# Patient Record
Sex: FEMALE | Race: WHITE | Employment: OTHER | URBAN - METROPOLITAN AREA
[De-identification: names, ages, dates, MRNs, and addresses within clinical notes are randomized per-mention and may not be internally consistent; named-entity substitution may affect disease eponyms.]

---

## 2020-01-13 ENCOUNTER — APPOINTMENT (OUTPATIENT)
Dept: GENERAL RADIOLOGY | Facility: CLINIC | Age: 75
End: 2020-01-13
Attending: EMERGENCY MEDICINE
Payer: MEDICARE

## 2020-01-13 ENCOUNTER — HOSPITAL ENCOUNTER (EMERGENCY)
Facility: CLINIC | Age: 75
Discharge: HOME | End: 2020-01-13
Attending: EMERGENCY MEDICINE | Admitting: EMERGENCY MEDICINE
Payer: MEDICARE

## 2020-01-13 ENCOUNTER — APPOINTMENT (OUTPATIENT)
Dept: CARDIOLOGY | Facility: CLINIC | Age: 75
End: 2020-01-13
Attending: EMERGENCY MEDICINE
Payer: MEDICARE

## 2020-01-13 VITALS
OXYGEN SATURATION: 96 % | RESPIRATION RATE: 8 BRPM | HEART RATE: 66 BPM | DIASTOLIC BLOOD PRESSURE: 94 MMHG | TEMPERATURE: 97.9 F | SYSTOLIC BLOOD PRESSURE: 152 MMHG

## 2020-01-13 DIAGNOSIS — R53.83 OTHER FATIGUE: ICD-10-CM

## 2020-01-13 DIAGNOSIS — R00.2 PALPITATIONS: ICD-10-CM

## 2020-01-13 LAB
ALBUMIN SERPL-MCNC: 3.8 G/DL (ref 3.4–5)
ALP SERPL-CCNC: 79 U/L (ref 40–150)
ALT SERPL W P-5'-P-CCNC: 15 U/L (ref 0–50)
ANION GAP SERPL CALCULATED.3IONS-SCNC: 5 MMOL/L (ref 3–14)
AST SERPL W P-5'-P-CCNC: 15 U/L (ref 0–45)
BASOPHILS # BLD AUTO: 0.1 10E9/L (ref 0–0.2)
BASOPHILS NFR BLD AUTO: 1.4 %
BILIRUB SERPL-MCNC: 0.5 MG/DL (ref 0.2–1.3)
BUN SERPL-MCNC: 17 MG/DL (ref 7–30)
CALCIUM SERPL-MCNC: 9.3 MG/DL (ref 8.5–10.1)
CHLORIDE SERPL-SCNC: 109 MMOL/L (ref 94–109)
CO2 SERPL-SCNC: 25 MMOL/L (ref 20–32)
CREAT SERPL-MCNC: 0.53 MG/DL (ref 0.52–1.04)
DIFFERENTIAL METHOD BLD: NORMAL
EOSINOPHIL # BLD AUTO: 0.2 10E9/L (ref 0–0.7)
EOSINOPHIL NFR BLD AUTO: 4.7 %
ERYTHROCYTE [DISTWIDTH] IN BLOOD BY AUTOMATED COUNT: 12.5 % (ref 10–15)
GFR SERPL CREATININE-BSD FRML MDRD: >90 ML/MIN/{1.73_M2}
GLUCOSE SERPL-MCNC: 104 MG/DL (ref 70–99)
HCT VFR BLD AUTO: 44.8 % (ref 35–47)
HGB BLD-MCNC: 14.4 G/DL (ref 11.7–15.7)
IMM GRANULOCYTES # BLD: 0 10E9/L (ref 0–0.4)
IMM GRANULOCYTES NFR BLD: 0.2 %
INTERPRETATION ECG - MUSE: NORMAL
LYMPHOCYTES # BLD AUTO: 1.5 10E9/L (ref 0.8–5.3)
LYMPHOCYTES NFR BLD AUTO: 29.1 %
MAGNESIUM SERPL-MCNC: 2.2 MG/DL (ref 1.6–2.3)
MCH RBC QN AUTO: 29.4 PG (ref 26.5–33)
MCHC RBC AUTO-ENTMCNC: 32.1 G/DL (ref 31.5–36.5)
MCV RBC AUTO: 92 FL (ref 78–100)
MONOCYTES # BLD AUTO: 0.4 10E9/L (ref 0–1.3)
MONOCYTES NFR BLD AUTO: 8.1 %
NEUTROPHILS # BLD AUTO: 2.9 10E9/L (ref 1.6–8.3)
NEUTROPHILS NFR BLD AUTO: 56.5 %
NRBC # BLD AUTO: 0 10*3/UL
NRBC BLD AUTO-RTO: 0 /100
PLATELET # BLD AUTO: 214 10E9/L (ref 150–450)
POTASSIUM SERPL-SCNC: 3.7 MMOL/L (ref 3.4–5.3)
PROT SERPL-MCNC: 7.6 G/DL (ref 6.8–8.8)
RBC # BLD AUTO: 4.89 10E12/L (ref 3.8–5.2)
SODIUM SERPL-SCNC: 139 MMOL/L (ref 133–144)
T4 FREE SERPL-MCNC: 1.41 NG/DL (ref 0.76–1.46)
TROPONIN I SERPL-MCNC: <0.015 UG/L (ref 0–0.04)
TSH SERPL DL<=0.005 MIU/L-ACNC: 5.76 MU/L (ref 0.4–4)
WBC # BLD AUTO: 5.2 10E9/L (ref 4–11)

## 2020-01-13 PROCEDURE — 84484 ASSAY OF TROPONIN QUANT: CPT | Performed by: EMERGENCY MEDICINE

## 2020-01-13 PROCEDURE — 93005 ELECTROCARDIOGRAM TRACING: CPT | Mod: 59

## 2020-01-13 PROCEDURE — 0298T ZIO PATCH HOLTER ADULT PEDIATRIC GREATER THAN 48 HRS: CPT | Performed by: INTERNAL MEDICINE

## 2020-01-13 PROCEDURE — 0296T ZIO PATCH HOLTER ADULT PEDIATRIC GREATER THAN 48 HRS: CPT

## 2020-01-13 PROCEDURE — 80053 COMPREHEN METABOLIC PANEL: CPT | Performed by: EMERGENCY MEDICINE

## 2020-01-13 PROCEDURE — 84439 ASSAY OF FREE THYROXINE: CPT | Performed by: EMERGENCY MEDICINE

## 2020-01-13 PROCEDURE — 85025 COMPLETE CBC W/AUTO DIFF WBC: CPT | Performed by: EMERGENCY MEDICINE

## 2020-01-13 PROCEDURE — 84443 ASSAY THYROID STIM HORMONE: CPT | Performed by: EMERGENCY MEDICINE

## 2020-01-13 PROCEDURE — 83735 ASSAY OF MAGNESIUM: CPT | Performed by: EMERGENCY MEDICINE

## 2020-01-13 PROCEDURE — 71046 X-RAY EXAM CHEST 2 VIEWS: CPT

## 2020-01-13 PROCEDURE — 99285 EMERGENCY DEPT VISIT HI MDM: CPT | Mod: 25

## 2020-01-13 ASSESSMENT — ENCOUNTER SYMPTOMS
LIGHT-HEADEDNESS: 1
FATIGUE: 1
PALPITATIONS: 1

## 2020-01-13 NOTE — ED AVS SNAPSHOT
[facility] Emergency Department  [address]  [facility] [address]  Phone:  [phone]  Fax:  [phone]                                    Whitney Escalona   MRN: [id number]    Department:  [facility] Emergency Department   Date of Visit:  1/13/2020           After Visit Summary Signature Page    I have received my discharge instructions, and my questions have been answered. I have discussed any challenges I see with this plan with the nurse or doctor.    ..........................................................................................................................................  Patient/Patient Representative Signature      ..........................................................................................................................................  Patient Representative Print Name and Relationship to Patient    ..................................................               ................................................  Date                                   Time    ..........................................................................................................................................  Reviewed by Signature/Title    ...................................................              ..............................................  Date                                               Time          22EPIC Rev 08/18

## 2020-01-13 NOTE — ED TRIAGE NOTES
Pt A&Ox4. ABCs intact. Last couple of weeks, Pt felt heart palpitation and heart racing. At evening, Pt feels extremely tired.

## 2020-01-13 NOTE — ED PROVIDER NOTES
"  History     Chief Complaint:  Palpitations    The history is provided by the patient.      Whitney Escalona is a 74 year old female with a history of mild aortic sclerosis and a personal history of hypotension who presents to the emergency department today for evaluation of palpitations. The patient reports that over the past month she has been experiencing palpitations and increased fatigue. She states she usually wakes up around 0730 and by 0900 she is completely worn out and ready for a nap which is new for her. The patient notes two to three episodes of palpitations each week, each episode lasting approximately a half hour. She states the episodes tend to come on while lying down, mostly overnight, and they typically resolve with time; during the episodes she has the sensation her heart is beating fast, but when she takes her pulse on her neck it feels normal to her. The patient has also been feeling lightheaded intermittently, she has never passed out with this. She states she has also been experiencing chest pains, this morning she took Tums and notes resolution of this. This morning she woke up and took her blood pressure which was initially in the 120 range and she noticed a heart flashing on the machine indicating an irregular rhythm which prompted her evaluation this morning. She reports that prior to leaving her home her blood pressure was elevated to 156 but she states this is abnormal for her; the patient states that her blood pressure usually ran in the 103-range prior to the past month. The patient notes she saw her primary care provider a few weeks back who suspected her symptoms were due to anxiety. The patient adds that she was evaluated at [place] approximately a year ago at which time they suspected she had an irregular beat that had resolved per her report. She also notes a distant workup at the [facility] for \"silent migraines\" that were concerning for mini strokes per her report, she " has been taking daily Aspirin for this.     Allergies:  Clindamycin  Latex   Fentanyl  Lactose  Latex  Esomeprazole  Nitrofurantoin     Medications:    Baby Aspirin    Past Medical History:    Migraines   GERD  Aortic sclerosis, mild   Ovarian cysts, bilateral     Past Surgical History:    Tubal ligation  Knee surgery x 2, left     Family History:    Hypertension  Heart disease- Brother  Hyperlipidemia  Stroke   Cancer- prostate, lymphoma, breast   Arthritis     Social History:  The patient was accompanied to the ED by her .  Smoking Status: Former smoker, quit 1983  Smokeless Tobacco: Never Used  Marital Status:      Review of Systems   Constitutional: Positive for fatigue.   Cardiovascular: Positive for chest pain and palpitations.   Neurological: Positive for light-headedness. Negative for syncope.   All other systems reviewed and are negative.      Physical Exam     Patient Vitals for the past 24 hrs:   BP Temp Temp src Pulse Heart Rate Resp SpO2   01/13/20 1200 (!) 152/94 -- -- 66 64 8 96 %   01/13/20 1130 (!) 151/96 -- -- 64 69 -- 96 %   01/13/20 1115 (!) 145/92 -- -- 64 70 16 96 %   01/13/20 1100 (!) 141/88 -- -- 63 66 18 96 %   01/13/20 1045 (!) 147/84 -- -- 63 64 -- 94 %   01/13/20 1030 (!) 143/84 -- -- 63 66 12 95 %   01/13/20 1015 (!) 146/89 -- -- 65 64 11 94 %   01/13/20 0933 -- -- -- -- 73 13 95 %   01/13/20 0930 (!) 162/102 97.9  F (36.6  C) Oral 67 68 -- --   01/13/20 0925 (!) 198/119 -- -- 78 73 14 95 %   01/13/20 0921 (!) 196/128 -- Oral 80 -- 18 95 %      Physical Exam  Vital signs and nursing notes reviewed.     Constitutional: laying on gurney appears comfortable  HENT: Oropharynx is clear and moist  Eyes: Conjunctivae are normal bilaterally. Pupils equal  Neck: normal range of motion  Cardiovascular: Normal rate, regular rhythm, normal heart sounds.   Pulmonary/Chest: Effort normal and breath sounds normal. No respiratory distress.   Abdominal: Soft. Bowel sounds are normal. No  tenderness to palpation. No rebound or guarding.   Musculoskeletal: No joint swelling or edema.   Neurological: Alert and oriented. No focal weakness  Skin: Skin is warm and dry. No rash noted.   Psych: normal affect    Emergency Department Course     ECG:  ECG taken at 0918, ECG read at 0920  Normal sinus rhythm  Normal ECG   Rate 82 bpm. NC interval 176. QRS duration 78. QT/QTc 382/446. P-R-T axes 50 0 33.     Imaging:  Radiology findings were communicated with the patient and family who voiced understanding of the findings.  XR, Chest, PA & LAT  There are no acute infiltrates. The cardiac silhouette is not enlarged. Pulmonary vasculature is unremarkable.   Reading per radiology      Laboratory:  Laboratory findings were communicated with the patient and family who voiced understanding of the findings.  CBC: AWNL (WBC 5.2, HGB 14.4, )  CMP: Glucose 104 (H). O/w WNL (Creatinine 0.53)    Troponin (Collected 0924): <0.015   TSH with free T4 reflex: 5.76 (H)   T4 free: 1.41  Magnesium: 2.2     Emergency Department Course:  0912 Nursing notes and vitals reviewed.  0917 I performed an exam of the patient as documented above.   0918 An ECG was performed, results above.   0927 IV was inserted and blood was drawn for laboratory testing, results above.   0941 The patient was sent for a chest x-ray while in the emergency department, results above.    1204 Patient was rechecked and updated with plan for ZioPatch monitor for a week set up here today.    Findings and plan explained to the Patient and spouse. Patient discharged home with instructions regarding supportive care, medications, and reasons to return. The importance of close follow-up was reviewed.     I personally reviewed the laboratory and imaging results with the Patient and spouse and answered all related questions prior to discharge.      Impression & Plan      Medical Decision Making:    Whitney Escalona is a 74 year old female who presents with symptoms  of fatigue off and on for quite some time but also feeling palpitations on occasion where she feels that her heart has racing which has been going on two to three times a week for the past several weeks. The patient said that her blood pressure cuff was reading irregular heart rhythm, so she came in for evaluation. However, on arrival the patient was in a sinus rhythm without dysrhythmia. Her vital signs other than initially high blood pressure were normal, her blood pressure improved with time while in the emergency department. Her laboratory tests did not show any significant abnormalities. The patient was observed in the emergency department, had no dysthymias with continuous cardiac monitoring. Her chest x-ray is unremarkable and she continues to appear well. There is no clear indication the patient needs to be admitted to the hospital. It is unclear if she is actually feeling palpitations, of if this is more anxiety related at this time because it seems to happen more often at night and she feels the palpitations, not during the daytime. I cannot exclude that she is experiencing paroxysmal episodes of atrial fibrillation/flutter or other dysrhythmias. Therefore I ordered her to have a ZioPatch which was placed today in the emergency department. She will follow up closely with her primary care provider.  If she has any worsening symptomatology she will return here. The patient understands the plan and is discharged home.       Diagnosis:    ICD-10-CM    1. Palpitations R00.2    2. Other fatigue R53.83        Disposition:  The patient is discharged to home.     Scribe Disclosure:  I, Tosha Yo, am serving as a scribe at 9:17 AM on 1/13/2020 to document services personally performed by Noel Lam MD based on my observations and the provider's statements to me.      1/13/2020   [facility] EMERGENCY DEPARTMENT       Noel Lam MD  01/13/20 1700

## 2023-09-19 ENCOUNTER — TRANSCRIBE ORDERS (OUTPATIENT)
Dept: OTHER | Age: 78
End: 2023-09-19

## 2023-09-19 DIAGNOSIS — G70.00 MYASTHENIA GRAVIS (H): Primary | ICD-10-CM
